# Patient Record
Sex: FEMALE | URBAN - METROPOLITAN AREA
[De-identification: names, ages, dates, MRNs, and addresses within clinical notes are randomized per-mention and may not be internally consistent; named-entity substitution may affect disease eponyms.]

---

## 2021-07-01 ENCOUNTER — DOCUMENTATION (OUTPATIENT)
Dept: OBGYN CLINIC | Facility: CLINIC | Age: 26
End: 2021-07-01

## 2021-07-05 NOTE — PROGRESS NOTES
Patient presented to [facility] ER with early pregnancy and abdominal pain  She was diagnosed with a ruptured ectopic pregnancy and taken to the OR for evaluation by Dr Maxine Faustin, [facility]  I, Dr Kade Lombardi, was asked to assist in surgery  The patient underwent Diagnostic Laparotomy and left salpingectomy for ruptured ectopic pregnancy and hemoperitoneum